# Patient Record
(demographics unavailable — no encounter records)

---

## 2017-02-22 NOTE — XR
Cervical spine

 

HISTORY: Slip and fall, pain

 

5 views of the cervical spine and 6 images

 

Cervical vertebral bodies show preserved height and alignment, bone mineralization. Disc spaces and p
revertebral soft tissues are normal. No significant foraminal encroachment.

 

IMPRESSION: No acute fracture or subluxation is evident

## 2017-02-22 NOTE — XR
EXAMINATION TYPE: XR shoulder complete RT

 

DATE OF EXAM: 2/22/2017 11:13 AM

 

CLINICAL HISTORY: Falling injury with right shoulder pain.

 

TECHNIQUE:  Three views of the right shoulder are obtained.

 

COMPARISON: None. 

 

FINDINGS:  There is no acute fracture/dislocation evident in the right shoulder.  The acromioclavicul
ar and glenohumeral joint spaces appear within normal limits.  The visualized ribs are intact and unr
emarkable.

 

IMPRESSION:  There is no acute fracture or dislocation in the right shoulder.

## 2017-09-05 NOTE — XR
Left foot

 

HISTORY: Left foot pain

 

3 views of the left foot

 

There is a plantar calcaneal spur. Enthesophyte present at the insertion of the Achilles tendon. Bone
 mineralization, joint spaces and alignment are maintained. There is mild hypertrophic change at the 
second metatarsophalangeal joint, first metatarsophalangeal joint.

 

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 Plantar calcane
al spur.

## 2021-08-04 NOTE — ED
Back Pain HPI





- General


Chief Complaint: Back Pain/Injury


Stated Complaint: back pain


Time Seen by Provider: 08/04/21 12:14


Source: patient, RN notes reviewed


Limitations: no limitations





- History of Present Illness


Initial Comments: 





Patient is a 51-year-old female that presents to the emergency department 

complaining of chronic low back pain with radiation down the right leg.  She 

notes that she got a cortisone injection by her primary care several days ago.  

She notes that since then the pain has increased.  She notes that she has not 

had any recent injury or trauma to her low back.  She notes that the pain is 

causing some nausea.  She notes that she does not follow-up with a pain medicine

doctor, a spinal surgeon, and has not done physical therapy in a while.  She de

nied any chest pain shortness of breath headache vomiting diarrhea constipation 

fever fatigue chills.  Patient denied any saddle anesthesia, bowel incontinence 

or retention.





- Related Data


                                    Allergies











Allergy/AdvReac Type Severity Reaction Status Date / Time


 


No Known Allergies Allergy   Verified 08/04/21 12:07














Review of Systems


ROS Statement: 


Those systems with pertinent positive or pertinent negative responses have been 

documented in the HPI.





ROS Other: All systems not noted in ROS Statement are negative.





Past Medical History


Past Medical History: Diabetes Mellitus


Additional Past Medical History / Comment(s): back pain


History of Any Multi-Drug Resistant Organisms: None Reported


Past Surgical History: Cholecystectomy


Past Psychological History: No Psychological Hx Reported


Smoking Status: Never smoker


Past Alcohol Use History: None Reported


Past Drug Use History: None Reported





General Exam


Limitations: no limitations


General appearance: alert, in no apparent distress, obese


Head exam: Present: atraumatic, normocephalic, normal inspection


Eye exam: Present: normal appearance, PERRL, EOMI.  Absent: scleral icterus, 

conjunctival injection, periorbital swelling


Neck exam: Present: normal inspection


Respiratory exam: Present: normal lung sounds bilaterally.  Absent: respiratory 

distress, wheezes, rales, rhonchi, stridor


Cardiovascular Exam: Present: regular rate, normal rhythm, normal heart sounds. 

Absent: systolic murmur, diastolic murmur, rubs, gallop, clicks


Extremities exam: Present: normal inspection, full ROM, normal capillary refill.

 Absent: tenderness, pedal edema, joint swelling, calf tenderness


Back exam: Present: normal inspection, tenderness (Right SI)


Neurological exam: Present: alert, oriented X3


Psychiatric exam: Present: normal affect, normal mood


Skin exam: Present: warm, dry, intact, normal color.  Absent: rash





Course





                                   Vital Signs











  08/04/21





  12:08


 


Temperature 98.3 F


 


Pulse Rate 122 H


 


Respiratory 20





Rate 


 


Blood Pressure 137/63


 


O2 Sat by Pulse 97





Oximetry 














Medical Decision Making





- Medical Decision Making





51-year-old female complaining of low back pain with radicular symptoms down the

right leg.


X-ray lumbar spine, 125 mg of Solu-Medrol, 4 mg of morphine ordered.


X-ray of the lumbar spine negative for any acute process.


Case discussed with Dr. Carballo, patient can discharge home with follow-up to 

neurology, spine specialist, primary care.





- Radiology Data


Radiology results: report reviewed, image reviewed





Lumbar x-ray: There is loss of disc height at L5-S1 remaining disc heights are 

preserved.  Vertebral body heights are preserved.  Alignment is normal.  There 

are 5 lumbar type vertebral bodies.  The pedicles are intact.  No significant 

interval changes evident.





Disposition


Clinical Impression: 


 Sciatica, Lumbar radiculopathy





Disposition: HOME SELF-CARE


Condition: Stable


Instructions (If sedation given, give patient instructions):  Acute Low Back 

Pain (ED)


Additional Instructions: 


Please return to the Emergency Department if symptoms worsen or any other 

concerns.


Follow-up with primary care in the next 1-2 days get referral for physical 

therapy if needed.


Follow-up with with [facility] in the next several days.


Continue to follow-up with neurology.


Follow-up with pain medicine doctor for chronic pain management.





Is patient prescribed a controlled substance at d/c from ED?: No


Referrals: 


Cyndee Velázquez MD [Primary Care Provider] - 1-2 days


Jarrett Laureano DO [Doctor of Osteopathic Medicine] - 1-2 days


Luiz Hill MD [STAFF PHYSICIAN] - 1-2 days


Time of Disposition: 13:50

## 2021-08-04 NOTE — XR
EXAMINATION TYPE: XR lumbar spine 2 or 3V

 

DATE OF EXAM: 8/4/2021

 

COMPARISON: 7/14/2017

 

HISTORY: Chronic sciatica

 

TECHNIQUE: 3 view lumbar spine

 

FINDINGS: There is loss of disc height L5-S1. Remaining disc heights are preserved. Vertebral body he
ights are preserved. Alignment is normal. There are 5 lumbar-type vertebral bodies. The pedicles are 
intact. No significant interval change is evident.

 

IMPRESSION:

1.  Degenerative disc changes L5-S1.

2. Stable exam

## 2022-06-08 NOTE — MM
Reason for Exam: Screening  (asymptomatic). 

Last mammogram was performed 11 year(s) and 1 month(s) ago. 





Patient History: 

Menarche at age 11. Patient has no children.

Paternal cousin had breast cancer, age 50. Paternal aunt had breast cancer, age 60. 





Risk Values: 

Candy 5 year model risk: 1.2%.

NCI Lifetime model risk: 10.6%.





Prior Study Comparison: 

5/27/2011 Bilateral Screening Mammogram, [facility]. 





Tissue Density: 

The breast tissue is heterogeneously dense. This may lower the sensitivity of mammography.





Findings: 

Analyzed By CAD. 

There is no suspicious group of microcalcifications or new suspicious mass in either breast. 





Overall Assessment: Negative, BI-RAD 1





Management: 

Screening Mammogram of both breasts in 1 year.

A clinical breast exam by your physician is recommended on an annual basis and results should be

correlated with mammographic findings.



Electronically signed and approved by: Leopold M. Fregoli, M.D. Radiologis